# Patient Record
Sex: FEMALE | Race: WHITE | HISPANIC OR LATINO | URBAN - METROPOLITAN AREA
[De-identification: names, ages, dates, MRNs, and addresses within clinical notes are randomized per-mention and may not be internally consistent; named-entity substitution may affect disease eponyms.]

---
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
## 2020-09-07 ENCOUNTER — EMERGENCY (EMERGENCY)
Facility: HOSPITAL | Age: 20
LOS: 1 days | End: 2020-09-07
Admitting: EMERGENCY MEDICINE
Payer: MEDICAID

## 2020-09-07 PROCEDURE — 99285 EMERGENCY DEPT VISIT HI MDM: CPT

## 2020-09-07 PROCEDURE — 76856 US EXAM PELVIC COMPLETE: CPT | Mod: 26

## 2020-09-07 PROCEDURE — 74177 CT ABD & PELVIS W/CONTRAST: CPT | Mod: 26

## 2020-09-10 ENCOUNTER — EMERGENCY (EMERGENCY)
Facility: HOSPITAL | Age: 20
LOS: 1 days | End: 2020-09-10
Admitting: EMERGENCY MEDICINE
Payer: MEDICAID

## 2020-09-10 PROCEDURE — 99053 MED SERV 10PM-8AM 24 HR FAC: CPT

## 2020-09-10 PROCEDURE — 99285 EMERGENCY DEPT VISIT HI MDM: CPT

## 2020-09-11 PROCEDURE — 74176 CT ABD & PELVIS W/O CONTRAST: CPT | Mod: 26

## 2021-06-22 ENCOUNTER — EMERGENCY (EMERGENCY)
Facility: HOSPITAL | Age: 21
LOS: 1 days | End: 2021-06-22
Payer: MEDICAID

## 2021-06-22 PROCEDURE — 99284 EMERGENCY DEPT VISIT MOD MDM: CPT

## 2022-03-01 ENCOUNTER — EMERGENCY (EMERGENCY)
Facility: HOSPITAL | Age: 22
LOS: 1 days | Discharge: HOME | End: 2022-03-01
Admitting: EMERGENCY MEDICINE
Payer: MEDICAID

## 2022-03-01 PROCEDURE — 99284 EMERGENCY DEPT VISIT MOD MDM: CPT

## 2022-03-03 DIAGNOSIS — R10.84 GENERALIZED ABDOMINAL PAIN: ICD-10-CM

## 2024-12-17 ENCOUNTER — INPATIENT (INPATIENT)
Facility: HOSPITAL | Age: 24
LOS: 1 days | Discharge: HOME | End: 2024-12-19
Attending: OBSTETRICS & GYNECOLOGY | Admitting: OBSTETRICS & GYNECOLOGY
Payer: MEDICAID

## 2024-12-17 VITALS
HEART RATE: 121 BPM | RESPIRATION RATE: 18 BRPM | DIASTOLIC BLOOD PRESSURE: 76 MMHG | SYSTOLIC BLOOD PRESSURE: 122 MMHG | TEMPERATURE: 99 F

## 2024-12-17 DIAGNOSIS — Z3A.33 33 WEEKS GESTATION OF PREGNANCY: ICD-10-CM

## 2024-12-17 DIAGNOSIS — O60.00 PRETERM LABOR WITHOUT DELIVERY, UNSPECIFIED TRIMESTER: ICD-10-CM

## 2024-12-17 DIAGNOSIS — Z00.00 ENCOUNTER FOR GENERAL ADULT MEDICAL EXAMINATION WITHOUT ABNORMAL FINDINGS: ICD-10-CM

## 2024-12-17 DIAGNOSIS — O26.893 OTHER SPECIFIED PREGNANCY RELATED CONDITIONS, THIRD TRIMESTER: ICD-10-CM

## 2024-12-17 LAB
ALBUMIN SERPL ELPH-MCNC: 3.5 G/DL — SIGNIFICANT CHANGE UP (ref 3.3–5.2)
ALP SERPL-CCNC: 192 U/L — HIGH (ref 40–120)
ALT FLD-CCNC: 15 U/L — SIGNIFICANT CHANGE UP
ANION GAP SERPL CALC-SCNC: 13 MMOL/L — SIGNIFICANT CHANGE UP (ref 5–17)
APPEARANCE UR: ABNORMAL
APTT BLD: 28.4 SEC — SIGNIFICANT CHANGE UP (ref 24.5–35.6)
AST SERPL-CCNC: 25 U/L — SIGNIFICANT CHANGE UP
BACTERIA # UR AUTO: ABNORMAL /HPF
BASOPHILS # BLD AUTO: 0.02 K/UL — SIGNIFICANT CHANGE UP (ref 0–0.2)
BASOPHILS NFR BLD AUTO: 0.1 % — SIGNIFICANT CHANGE UP (ref 0–2)
BILIRUB SERPL-MCNC: <0.2 MG/DL — LOW (ref 0.4–2)
BILIRUB UR-MCNC: NEGATIVE — SIGNIFICANT CHANGE UP
BLD GP AB SCN SERPL QL: SIGNIFICANT CHANGE UP
BUN SERPL-MCNC: 5.1 MG/DL — LOW (ref 8–20)
CALCIUM SERPL-MCNC: 9.2 MG/DL — SIGNIFICANT CHANGE UP (ref 8.4–10.5)
CAST: 0 /LPF — SIGNIFICANT CHANGE UP (ref 0–4)
CHLORIDE SERPL-SCNC: 103 MMOL/L — SIGNIFICANT CHANGE UP (ref 96–108)
CO2 SERPL-SCNC: 18 MMOL/L — LOW (ref 22–29)
COLOR SPEC: YELLOW — SIGNIFICANT CHANGE UP
CREAT SERPL-MCNC: 0.49 MG/DL — LOW (ref 0.5–1.3)
DIFF PNL FLD: ABNORMAL
EGFR: 133 ML/MIN/1.73M2 — SIGNIFICANT CHANGE UP
EOSINOPHIL # BLD AUTO: 0.01 K/UL — SIGNIFICANT CHANGE UP (ref 0–0.5)
EOSINOPHIL NFR BLD AUTO: 0.1 % — SIGNIFICANT CHANGE UP (ref 0–6)
FIBRINOGEN PPP-MCNC: 636 MG/DL — HIGH (ref 200–450)
GLUCOSE SERPL-MCNC: 86 MG/DL — SIGNIFICANT CHANGE UP (ref 70–99)
GLUCOSE UR QL: NEGATIVE MG/DL — SIGNIFICANT CHANGE UP
HCT VFR BLD CALC: 35.8 % — SIGNIFICANT CHANGE UP (ref 34.5–45)
HGB BLD-MCNC: 12.2 G/DL — SIGNIFICANT CHANGE UP (ref 11.5–15.5)
IMM GRANULOCYTES NFR BLD AUTO: 0.3 % — SIGNIFICANT CHANGE UP (ref 0–0.9)
KETONES UR-MCNC: 15 MG/DL
LEUKOCYTE ESTERASE UR-ACNC: NEGATIVE — SIGNIFICANT CHANGE UP
LYMPHOCYTES # BLD AUTO: 0.99 K/UL — LOW (ref 1–3.3)
LYMPHOCYTES # BLD AUTO: 6.9 % — LOW (ref 13–44)
MCHC RBC-ENTMCNC: 29.8 PG — SIGNIFICANT CHANGE UP (ref 27–34)
MCHC RBC-ENTMCNC: 34.1 G/DL — SIGNIFICANT CHANGE UP (ref 32–36)
MCV RBC AUTO: 87.5 FL — SIGNIFICANT CHANGE UP (ref 80–100)
MONOCYTES # BLD AUTO: 0.17 K/UL — SIGNIFICANT CHANGE UP (ref 0–0.9)
MONOCYTES NFR BLD AUTO: 1.2 % — LOW (ref 2–14)
NEUTROPHILS # BLD AUTO: 13.15 K/UL — HIGH (ref 1.8–7.4)
NEUTROPHILS NFR BLD AUTO: 91.4 % — HIGH (ref 43–77)
NITRITE UR-MCNC: NEGATIVE — SIGNIFICANT CHANGE UP
PH UR: 7.5 — SIGNIFICANT CHANGE UP (ref 5–8)
PLATELET # BLD AUTO: 363 K/UL — SIGNIFICANT CHANGE UP (ref 150–400)
POTASSIUM SERPL-MCNC: 3.8 MMOL/L — SIGNIFICANT CHANGE UP (ref 3.5–5.3)
POTASSIUM SERPL-SCNC: 3.8 MMOL/L — SIGNIFICANT CHANGE UP (ref 3.5–5.3)
PROT SERPL-MCNC: 6.6 G/DL — SIGNIFICANT CHANGE UP (ref 6.6–8.7)
PROT UR-MCNC: NEGATIVE MG/DL — SIGNIFICANT CHANGE UP
RBC # BLD: 4.09 M/UL — SIGNIFICANT CHANGE UP (ref 3.8–5.2)
RBC # FLD: 13.3 % — SIGNIFICANT CHANGE UP (ref 10.3–14.5)
RBC CASTS # UR COMP ASSIST: 3 /HPF — SIGNIFICANT CHANGE UP (ref 0–4)
SODIUM SERPL-SCNC: 134 MMOL/L — LOW (ref 135–145)
SP GR SPEC: 1.01 — SIGNIFICANT CHANGE UP (ref 1–1.03)
SQUAMOUS # UR AUTO: 3 /HPF — SIGNIFICANT CHANGE UP (ref 0–5)
URATE SERPL-MCNC: 4 MG/DL — SIGNIFICANT CHANGE UP (ref 2.4–5.7)
UROBILINOGEN FLD QL: 0.2 MG/DL — SIGNIFICANT CHANGE UP (ref 0.2–1)
WBC # BLD: 14.39 K/UL — HIGH (ref 3.8–10.5)
WBC # FLD AUTO: 14.39 K/UL — HIGH (ref 3.8–10.5)
WBC UR QL: 2 /HPF — SIGNIFICANT CHANGE UP (ref 0–5)

## 2024-12-17 PROCEDURE — 99222 1ST HOSP IP/OBS MODERATE 55: CPT

## 2024-12-17 PROCEDURE — 99234 HOSP IP/OBS SM DT SF/LOW 45: CPT | Mod: GC

## 2024-12-17 RX ORDER — NIFEDIPINE 10 MG
10 CAPSULE ORAL EVERY 6 HOURS
Refills: 0 | Status: DISCONTINUED | OUTPATIENT
Start: 2024-12-17 | End: 2024-12-17

## 2024-12-17 RX ORDER — NIFEDIPINE 10 MG
20 CAPSULE ORAL EVERY 6 HOURS
Refills: 0 | Status: DISCONTINUED | OUTPATIENT
Start: 2024-12-17 | End: 2024-12-18

## 2024-12-17 RX ORDER — 0.9 % SODIUM CHLORIDE 0.9 %
1000 INTRAVENOUS SOLUTION INTRAVENOUS
Refills: 0 | Status: DISCONTINUED | OUTPATIENT
Start: 2024-12-17 | End: 2024-12-19

## 2024-12-17 RX ORDER — 0.9 % SODIUM CHLORIDE 0.9 %
500 INTRAVENOUS SOLUTION INTRAVENOUS ONCE
Refills: 0 | Status: COMPLETED | OUTPATIENT
Start: 2024-12-17 | End: 2024-12-17

## 2024-12-17 RX ORDER — NIFEDIPINE 10 MG
10 CAPSULE ORAL ONCE
Refills: 0 | Status: COMPLETED | OUTPATIENT
Start: 2024-12-17 | End: 2024-12-17

## 2024-12-17 RX ORDER — BETAMETHASONE ACETATE,SOD PHOS 6 MG/ML
12 VIAL (ML) INJECTION EVERY 24 HOURS
Refills: 0 | Status: CANCELLED | OUTPATIENT
Start: 2024-12-18 | End: 2024-12-17

## 2024-12-17 RX ORDER — AMPICILLIN TRIHYDRATE 250 MG/5ML
1 SUSPENSION, RECONSTITUTED, ORAL (ML) ORAL EVERY 4 HOURS
Refills: 0 | Status: DISCONTINUED | OUTPATIENT
Start: 2024-12-17 | End: 2024-12-17

## 2024-12-17 RX ORDER — .BETA.-CAROTENE, SODIUM ACETATE, ASCORBIC ACID, CHOLECALCIFEROL, .ALPHA.-TOCOPHEROL ACETATE, DL-, THIAMINE MONONITRATE, RIBOFLAVIN, NIACINAMIDE, PYRIDOXINE HYDROCHLORIDE, FOLIC ACID, CYANOCOBALAMIN, CALCIUM CARBONATE, FERROUS FUMARATE, ZINC OXIDE AND CUPRIC OXIDE 2000; 2000; 120; 400; 22; 1.84; 3; 20; 10; 1; 12; 200; 27; 25; 2 [IU]/1; [IU]/1; MG/1; [IU]/1; MG/1; MG/1; MG/1; MG/1; MG/1; MG/1; UG/1; MG/1; MG/1; MG/1; MG/1
1 TABLET ORAL DAILY
Refills: 0 | Status: DISCONTINUED | OUTPATIENT
Start: 2024-12-17 | End: 2024-12-19

## 2024-12-17 RX ORDER — BETAMETHASONE ACETATE,SOD PHOS 6 MG/ML
12 VIAL (ML) INJECTION EVERY 24 HOURS
Refills: 0 | Status: DISCONTINUED | OUTPATIENT
Start: 2024-12-18 | End: 2024-12-18

## 2024-12-17 RX ORDER — AMPICILLIN TRIHYDRATE 250 MG/5ML
1 SUSPENSION, RECONSTITUTED, ORAL (ML) ORAL EVERY 4 HOURS
Refills: 0 | Status: DISCONTINUED | OUTPATIENT
Start: 2024-12-17 | End: 2024-12-18

## 2024-12-17 RX ADMIN — Medication 10 MILLIGRAM(S): at 15:07

## 2024-12-17 RX ADMIN — Medication 10 MILLIGRAM(S): at 17:37

## 2024-12-17 RX ADMIN — Medication 125 MILLILITER(S): at 17:00

## 2024-12-17 RX ADMIN — Medication 1000 MILLILITER(S): at 16:30

## 2024-12-17 RX ADMIN — Medication 108 GRAM(S): at 15:09

## 2024-12-17 RX ADMIN — Medication 125 MILLILITER(S): at 23:58

## 2024-12-17 RX ADMIN — Medication 108 GRAM(S): at 23:56

## 2024-12-17 RX ADMIN — Medication 20 MILLIGRAM(S): at 23:57

## 2024-12-17 RX ADMIN — Medication 108 GRAM(S): at 19:25

## 2024-12-17 NOTE — DISCHARGE NOTE ANTEPARTUM - FINANCIAL ASSISTANCE
[facility] provides services at a reduced cost to those who are determined to be eligible through [facility]’s financial assistance program. Information regarding [facility]’s financial assistance program can be found by going to [email or web address] or by calling [phone].

## 2024-12-17 NOTE — DISCHARGE NOTE ANTEPARTUM - NS MD DC FALL RISK RISK
For information on Fall & Injury Prevention, visit: [email or web address] OR  [email or web address] OR  https://www.cdc.gov/steadi/patient.html

## 2024-12-17 NOTE — CONSULT NOTE ADULT - ATTENDING COMMENTS
Pregnancy complicated by  labor. Recommend increasing the nifedipine dose to 20 mg q 6 h for tocolysis. Complete steroid course. Continue Ampicillin for GBS prophylaxis and continuous fetal/maternal monitoring.

## 2024-12-17 NOTE — CONSULT NOTE ADULT - PROBLEM SELECTOR RECOMMENDATION 9
Patient found to make cervical change between Friday and today from closed to 2cm. She was unchanged upon arrival at [facility]. She states her contractions have subsided, but they are still present on tocomotry, will continue with procardia 10IR q6h for tocoloysis and continuous uterine monitoring. Betamethasone was administered at 0830 this morning for fetal lung maturity, will give second dose tomorrow morning. She was also started on ampicillin q6h for GBS unknown status, swabs pending, will follow up. Vaginitis and GCCT swabs were also collected. Urinalysis was negative, urine culture is pending. Patient found to make cervical change between Friday and today from closed to 2cm. She was unchanged upon arrival at [facility]. She states her contractions have subsided, but they are still present on tocomotry, will continue with procardia 10IR q6h for tocoloysis and continuous uterine monitoring. Betamethasone was administered at 0830 this morning for fetal lung maturity, will give second dose tomorrow morning. She was also started on ampicillin for GBS unknown status, swabs pending, will follow up. Vaginitis and GCCT swabs were also collected. Urinalysis was negative, urine culture is pending. Patient found to make cervical change between Friday and today from closed to 2.5cm. She was unchanged upon arrival at [facility]. She states her contractions have subsided, but they are still present on tocometry, we recommend continue procardia 10mg IR q6h for tocolysis and continuous uterine monitoring. Betamethasone was administered at 0830 this morning for fetal lung maturity, will give second dose tomorrow morning. She was also started on ampicillin for GBS unknown status, GBS culture is pending. Vaginitis and GCCT swabs were also collected. Urinalysis was negative, urine culture is pending. She made cervical change between Friday and today from closed to 2.5cm. She was unchanged upon arrival at [facility]. She states her contractions have subsided, but they are still present on tocometry, we recommend continue procardia 10mg IR q6h for tocolysis and continuous uterine monitoring. Betamethasone was administered at 0830 this morning for fetal lung maturity, will give second dose tomorrow morning. She was also started on ampicillin for GBS unknown status, GBS culture is pending. Vaginitis and GCCT swabs were also collected. Urinalysis was negative, urine culture is pending.

## 2024-12-17 NOTE — DISCHARGE NOTE ANTEPARTUM - PATIENT PORTAL LINK FT
You can access the FollowMyHealth Patient Portal offered by [facility] by registering at the following website: [email or web address]. By joining [employer]’s FollowMyHealth portal, you will also be able to view your health information using other applications (apps) compatible with our system.

## 2024-12-17 NOTE — CONSULT NOTE ADULT - PROBLEM SELECTOR RECOMMENDATION 2
Patient in  labor, will continue with procardia for tocolysis until completion of betamethasone course, final dose due tomorrow morning. Ampicillin for GBS unknown status has been ordered. Continue with daily prenatal vitamins. FHT has been category 1, continue with continuous fetal monitoring for now. Growth scan today performed with Dr. Becerra, estimated fetal weight appropriate for gestational age (20.1%ile), vertex presentation. Patient in  labor, will continue with procardia for tocolysis until completion of betamethasone course, second dose due tomorrow morning. Ampicillin for GBS unknown status has been ordered. Continue with daily prenatal vitamins. FHT has been category 1, continue with continuous fetal monitoring for now.     Growth scan today performed EFW 2200g (20.1%ile), and vertex presentation. Bladder, bilateral kidneys, stomach, diaphragm, and 4 chamber views visualized.    NICU consultation can be considered if she has questions and makes further cervical change. Patient in  labor, will continue with procardia for tocolysis until completion of betamethasone course, second dose due tomorrow morning. Ampicillin for GBS unknown status has been ordered. Continue with daily prenatal vitamins. FHT has been category 1, continue with continuous fetal monitoring. Ultrasound scan today: EFW 2200g (20.1%ile), and vertex presentation.  NICU consultation is recommended

## 2024-12-17 NOTE — CONSULT NOTE ADULT - SUBJECTIVE AND OBJECTIVE BOX
BRAXTON MONTAGUE  A 24year old  Last Menstrual Period 24   EDC 25 at 33.4 weeks Gestational Age  transferred from [facility] for  labor. She was tod for past week, went to hospital on 12/3 was found to be C/L/P and discharged home. She was then reevaluated today for similar issues and found to have made change to 2.5/70/-3 and was admitted for transfer to [facility] for  labor. At [facility], she was given betamethasone at 0828, procardia loading dose x3 (last at 0915) and ampicillin. GBS and GC/CT swabs were collected, UA was found to be negative. Since she received the procardia she has not felt the contractions as much. At this time she feels well. Endorses good FM.    POB:   PGYN: -fibroids, -ovarian cysts, denies STD hx, denies abnormal PAPs   PMH: Denies  PSH: Liposuction, breast augmentation  SH: Denies EtOH, tobacco and illicit drug use during this pregnancy; feels safe at home   Meds: PNVs  Allergies: NKDA    Vital Signs:  Vital Signs Last 24 Hrs  T(C): 36.8 (17 Dec 2024 13:13), Max: 37.1 (17 Dec 2024 11:51)  T(F): 98.2 (17 Dec 2024 13:13), Max: 98.8 (17 Dec 2024 11:51)  HR: 112 (17 Dec 2024 13:13) (112 - 121)  BP: 120/71 (17 Dec 2024 13:13) (120/71 - 122/76)  RR: 19 (17 Dec 2024 13:13) (18 - 19)    Height (cm): 154.9 (24 @ 13:13)  Weight (kg): 68 (24 @ 13:13)  BMI (kg/m2): 28.3 (24 @ 13:13)  BSA (m2): 1.67 (24 @ 13:13)    Physical Exam:  General: Adult female in NAD  Head/Neck: No neck masses, no lymphadenopathy  Neuro: A&Ox3  Resp: breathing comfortably on RA   Abdomen: soft, non-tender, gravid uterus  Pelvic: as per ob resident note  Ext: No cyanosis, edema or calf tenderness  Skin: No rashes or lesions on exposed skin  Psych: normal affect and appropriate eye contact    FHT: 150 baseline, moderate variability, +accelerations, -decelerations   [place]: contractions q3 minutes    Bedside sono: vtx, levar 11.53cm, efw 2040g (20.1%ile)    Labs:                      12.2   14.39 )-----------( 363      ( 17 Dec 2024 12:12 )             35.8     12    134[L]  |  103  |  5.1[L]  ----------------------------<  86  3.8   |  18.0[L]  |  0.49[L]    Ca    9.2      17 Dec 2024 12:12    TPro  6.6  /  Alb  3.5  /  TBili  <0.2[L]  /  DBili  x   /  AST  25  /  ALT  15  /  AlkPhos  192[H]  12-17    PTT - ( 17 Dec 2024 12:12 )  PTT:28.4 sec    MEDICATIONS  (STANDING):  NIFEdipine IR 10 milliGRAM(s) Oral every 6 hours   BRAXTON MONTAGUE  A 24year old  Last Menstrual Period 24   EDC 25 at 33.5 weeks Gestational Age  transferred from [facility] for  labor. She was tod for past week, went to hospital on 12/3 was found to be C/L/P and discharged home. She was then reevaluated today for similar issues and found to have made change to 2.5/70/-3 and was admitted for transfer to [facility] for  labor. At [facility], she was given betamethasone at 0828, procardia loading dose x3 (last at 0915) and ampicillin. GBS and GC/CT swabs were collected, UA was found to be negative. Since she received the procardia she has not felt the contractions as much. At this time she feels well. Endorses good FM.    POB:   PGYN: -fibroids, -ovarian cysts, denies STD hx, denies abnormal PAPs   PMH: Denies  PSH: Liposuction, breast augmentation  SH: Denies EtOH, tobacco and illicit drug use during this pregnancy; feels safe at home   Meds: PNVs  Allergies: NKDA    Vital Signs:  Vital Signs Last 24 Hrs  T(C): 36.8 (17 Dec 2024 13:13), Max: 37.1 (17 Dec 2024 11:51)  T(F): 98.2 (17 Dec 2024 13:13), Max: 98.8 (17 Dec 2024 11:51)  HR: 112 (17 Dec 2024 13:13) (112 - 121)  BP: 120/71 (17 Dec 2024 13:13) (120/71 - 122/76)  RR: 19 (17 Dec 2024 13:13) (18 - 19)    Height (cm): 154.9 (24 @ 13:13)  Weight (kg): 68 (24 @ 13:13)  BMI (kg/m2): 28.3 (24 @ 13:13)  BSA (m2): 1.67 (24 @ 13:13)    Physical Exam:  General: Adult female in NAD  Head/Neck: No neck masses, no lymphadenopathy  Neuro: A&Ox3  Resp: breathing comfortably on RA   Abdomen: soft, non-tender, gravid uterus  Pelvic: as per ob resident note  Ext: No cyanosis, edema or calf tenderness  Skin: No rashes or lesions on exposed skin  Psych: normal affect and appropriate eye contact    FHT: 150 baseline, moderate variability, +accelerations, -decelerations   [place]: contractions q3 minutes    Bedside sono: vtx, levar 11.53cm, efw 2040g (20.1%ile)    Labs:                      12.2   14.39 )-----------( 363      ( 17 Dec 2024 12:12 )             35.8     12    134[L]  |  103  |  5.1[L]  ----------------------------<  86  3.8   |  18.0[L]  |  0.49[L]    Ca    9.2      17 Dec 2024 12:12    TPro  6.6  /  Alb  3.5  /  TBili  <0.2[L]  /  DBili  x   /  AST  25  /  ALT  15  /  AlkPhos  192[H]  12-17    PTT - ( 17 Dec 2024 12:12 )  PTT:28.4 sec    MEDICATIONS  (STANDING):  NIFEdipine IR 10 milliGRAM(s) Oral every 6 hours   BRAXTON MONTAGUE  A 24 year old  Last Menstrual Period 24 EDC 25 at 33 weeks and 5 gestational age. She presented as a transfer from [facility] for  labor. She was tod for the past week, was evaluated at the hospital on 12/3 and was found to be closed and therefore discharged home. She was then reevaluated today for similar issues and found to have made change to 2.5//-3. At [facility], she was given betamethasone at 0828, procardia loading dose x3 (last at 0915) and ampicillin. GBS and GC/CT swabs were collected, UA was found to be negative. She was then transferred to [facility] for  labor. Since she received the procardia she has not felt the contractions as much. At this time she feels well. Endorses good FM.    POB:   PGYN: -fibroids, -ovarian cysts, denies STD hx, denies abnormal PAPs   PMH: Denies  PSH: Liposuction, breast augmentation  SH: Denies EtOH, tobacco and illicit drug use during this pregnancy; feels safe at home   Meds: PNVs  Allergies: NKDA    Vital Signs:  Vital Signs Last 24 Hrs  T(C): 36.8 (17 Dec 2024 13:13), Max: 37.1 (17 Dec 2024 11:51)  T(F): 98.2 (17 Dec 2024 13:13), Max: 98.8 (17 Dec 2024 11:51)  HR: 112 (17 Dec 2024 13:13) (112 - 121)  BP: 120/71 (17 Dec 2024 13:13) (120/71 - 122/76)  RR: 19 (17 Dec 2024 13:13) (18 - 19)    Height (cm): 154.9 (24 @ 13:13)  Weight (kg): 68 (24 @ 13:13)  BMI (kg/m2): 28.3 (24 @ 13:13)  BSA (m2): 1.67 (24 @ 13:13)    Physical Exam:  General: Adult female in NAD  Head/Neck: No neck masses, no lymphadenopathy  Neuro: A&Ox3  Resp: breathing comfortably on RA   Abdomen: soft, non-tender, gravid uterus  Pelvic: as per ob resident note  Ext: No cyanosis, edema or calf tenderness  Skin: No rashes or lesions on exposed skin  Psych: normal affect and appropriate eye contact    FHT: 150 baseline, moderate variability, +accelerations, -decelerations   [place]: contractions q3 minutes    Bedside sono: vtx, levar 11.53cm, efw 2040g (20.1%ile)    Labs:                      12.2   14.39 )-----------( 363      ( 17 Dec 2024 12:12 )             35.8     12-    134[L]  |  103  |  5.1[L]  ----------------------------<  86  3.8   |  18.0[L]  |  0.49[L]    Ca    9.2      17 Dec 2024 12:12    TPro  6.6  /  Alb  3.5  /  TBili  <0.2[L]  /  DBili  x   /  AST  25  /  ALT  15  /  AlkPhos  192[H]  12-17    PTT - ( 17 Dec 2024 12:12 )  PTT:28.4 sec    MEDICATIONS  (STANDING):  NIFEdipine IR 10 milliGRAM(s) Oral every 6 hours   BRAXTON MONTAGUE  A 24 year old  Last Menstrual Period 24 EDC 25 at 33 weeks and 5 gestational age. She presented as a transfer from [facility] for  labor. She was having uterine contractions for the past week, was evaluated at the hospital on 12/3/24 and was found to have a close cervix. She was discharged home. She was evaluated today for similar symptoms. She was found to have cervical changes (Cx 2.5/70/-3). At [facility], she was given betamethasone at 0828, Pprocardia loading dose x3 (last at 0915) and Ampicillin. GBS and GC/CT swabs were collected, UA was found to be negative. She was then transferred to [facility] with a diagnosis of  labor. Since she received the procardia she has less frequent and less severe uterine contractions. At this time she feels well. Endorses good FM.    POB:   PGYN: -fibroids, -ovarian cysts, denies STD hx, denies abnormal PAPs   PMH: Denies  PSH: Liposuction, breast augmentation  SH: Denies EtOH, tobacco and illicit drug use during this pregnancy; feels safe at home   Meds: PNVs  Allergies: NKDA    Vital Signs:  Vital Signs Last 24 Hrs  T(C): 36.8 (17 Dec 2024 13:13), Max: 37.1 (17 Dec 2024 11:51)  T(F): 98.2 (17 Dec 2024 13:13), Max: 98.8 (17 Dec 2024 11:51)  HR: 112 (17 Dec 2024 13:13) (112 - 121)  BP: 120/71 (17 Dec 2024 13:13) (120/71 - 122/76)  RR: 19 (17 Dec 2024 13:13) (18 - 19)    Height (cm): 154.9 (24 @ 13:13)  Weight (kg): 68 (24 @ 13:13)  BMI (kg/m2): 28.3 (24 @ 13:13)  BSA (m2): 1.67 (24 @ 13:13)    Physical Exam:  General: Adult female in NAD  Head/Neck: No neck masses, no lymphadenopathy  Neuro: A&Ox3  Resp: breathing comfortably on RA   Abdomen: soft, non-tender, gravid uterus  Pelvic: as per ob resident note  Ext: No cyanosis, edema or calf tenderness  Skin: No rashes or lesions on exposed skin  Psych: normal affect and appropriate eye contact    FHT: 150 baseline, moderate variability, +accelerations, -decelerations   [place]: contractions q3 minutes    Bedside sono: vtx, levar 11.53cm, efw 2040g (20.1%ile)    Labs:                      12.2   14.39 )-----------( 363      ( 17 Dec 2024 12:12 )             35.8     12-    134[L]  |  103  |  5.1[L]  ----------------------------<  86  3.8   |  18.0[L]  |  0.49[L]    Ca    9.2      17 Dec 2024 12:12    TPro  6.6  /  Alb  3.5  /  TBili  <0.2[L]  /  DBili  x   /  AST  25  /  ALT  15  /  AlkPhos  192[H]  12-17    PTT - ( 17 Dec 2024 12:12 )  PTT:28.4 sec    MEDICATIONS  (STANDING):  NIFEdipine IR 10 milliGRAM(s) Oral every 6 hours   BRAXTON MONTAGUE is a 24 year old , by LMP 24 with EDC of  25, at 33 weeks and 5 gestational age. She presented as a transfer from [facility] for  labor. She was having uterine contractions for the past week, was evaluated at the hospital on 12/3/24 and found to have a close cervix. She was discharged home at that time. She was evaluated today for similar symptoms. She was found to have cervical changes (Cx 2.5/70/-3). At [facility], she was given betamethasone at 0828, Procardia loading dose x3 (last at 0915) and Ampicillin. GBS and GC/CT swabs were collected, UA was found to be negative. She was then transferred to [facility] with a diagnosis of  labor. Since she received the procardia she has less frequent and less severe uterine contractions. At this time she feels well. Endorses good FM.    POB:   PGYN: -fibroids, -ovarian cysts, denies STD hx, denies abnormal PAPs   PMH: Denies  PSH: Liposuction, breast augmentation  SH: Denies EtOH, tobacco and illicit drug use during this pregnancy; feels safe at home   Meds: PNVs  Allergies: NKDA    Vital Signs:  Vital Signs Last 24 Hrs  T(C): 36.8 (17 Dec 2024 13:13), Max: 37.1 (17 Dec 2024 11:51)  T(F): 98.2 (17 Dec 2024 13:13), Max: 98.8 (17 Dec 2024 11:51)  HR: 112 (17 Dec 2024 13:13) (112 - 121)  BP: 120/71 (17 Dec 2024 13:13) (120/71 - 122/76)  RR: 19 (17 Dec 2024 13:13) (18 - 19)    Height (cm): 154.9 (24 @ 13:13)  Weight (kg): 68 (24 @ 13:13)  BMI (kg/m2): 28.3 (24 @ 13:13)  BSA (m2): 1.67 (24 @ 13:13)    Physical Exam:  General: Adult female in NAD  Head/Neck: No neck masses, no lymphadenopathy  Neuro: A&Ox3  Resp: breathing comfortably on RA   Abdomen: soft, non-tender, gravid uterus  Pelvic: as per ob resident note  Ext: No cyanosis, edema or calf tenderness  Skin: No rashes or lesions on exposed skin  Psych: normal affect and appropriate eye contact    FHT: 150 baseline, moderate variability, +accelerations, -decelerations   [place]: contractions q3 minutes    Bedside sono: vtx, levar 11.53cm, efw 2040g (20.1%ile)    Labs:                      12.2   14.39 )-----------( 363      ( 17 Dec 2024 12:12 )             35.8     12-    134[L]  |  103  |  5.1[L]  ----------------------------<  86  3.8   |  18.0[L]  |  0.49[L]    Ca    9.2      17 Dec 2024 12:12    TPro  6.6  /  Alb  3.5  /  TBili  <0.2[L]  /  DBili  x   /  AST  25  /  ALT  15  /  AlkPhos  192[H]  12-17    PTT - ( 17 Dec 2024 12:12 )  PTT:28.4 sec    MEDICATIONS  (STANDING):  NIFEdipine IR 10 milliGRAM(s) Oral every 6 hours

## 2024-12-17 NOTE — CHART NOTE - NSCHARTNOTEFT_GEN_A_CORE
Called to patient room for rupture of membranes. Sohail pad with clear fluid and old blood noted. SSE confirmed + pooling. Rpt SVE 3.5/80/-2. Patient being moved back downstairs on L&D for continuous monitoring. Plan to continue procardia for tocolysis until 2nd dose of betamethasone administered at 0830. Continue ampicillin for GBS unknown status.  D/w Dr. Elias
Patient reports her contractions feel like they have spaced out to every 5-10m. Not more painful. NST remains reactive. Patient cleared to be moved upstairs with NST qshift. Patient to be continued on procardia for tocolysis and notify us if she feels her contractions worsen or become more frequent. Plan for BMZ in the morning for 2nd dose. Continue on Amp for GBS ppx.   D/w Dr. Elias

## 2024-12-17 NOTE — CONSULT NOTE ADULT - NSCONSULTADDITIONALINFOA_GEN_ALL_CORE
MFM Fellow Addendum    I have personally evaluated this patient and made relevant edits to the note above. She was discussed with Dr Johansen M Attending.     Pernell Becerra DO   M Fellow

## 2024-12-17 NOTE — OB PROVIDER H&P - ATTENDING COMMENTS
24y  at 33w4d GA by LMP consistent with first trimester sono who presents to L&D as a direct transfer from [facility] for  labor. She has been tod for past week, went to hospital on 12/3 was found to be C/L/P and discharged home. She was then reevaluated today for similar issues and found to have made change to 2.5/70/-3 and was admitted for transfer to [facility] for PTL. At [facility] she was given betamethasone at 828, procardia loading dose x3 (last at 915) and ampicillin. GBS and GC/CT was collected, UA was found to be negative. Since she received the procardia she has not felt the contractions as much. At this time she feels well. Endorses good FM.  T(C): 37.1 (24 @ 11:51), Max: 37.1 (-24 @ 11:51)  HR: 112 (24 @ 12:08) (112 - 121)  BP: 120/71 (-- @ 12:08) (120/71 - 122/76)  FHT -  reassuring for GA  toco - contractions q3  SVE: 2.5/80/-3  Bedside sono: Vertex  25 y/o  @ 33+4 with  labor   - admit to Antepartum   - consent obtained  - MFM consult  - plan betamethasone #2     Estrella Uribe MD

## 2024-12-17 NOTE — OB RN TRIAGE NOTE - FALL HARM RISK - UNIVERSAL INTERVENTIONS
Bed in lowest position, wheels locked, appropriate side rails in place/Call bell, personal items and telephone in reach/Instruct patient to call for assistance before getting out of bed or chair/Non-slip footwear when patient is out of bed/[place] to call system/Physically safe environment - no spills, clutter or unnecessary equipment/Purposeful Proactive Rounding/Room/bathroom lighting operational, light cord in reach

## 2024-12-17 NOTE — OB PROVIDER H&P - NSHPPHYSICALEXAM_GEN_ALL_CORE
T(C): 37.1 (12-17-24 @ 11:51), Max: 37.1 (12-17-24 @ 11:51)  HR: 112 (12-17-24 @ 12:08) (112 - 121)  BP: 120/71 (12-17-24 @ 12:08) (120/71 - 122/76)  RR: 18 (12-17-24 @ 11:51) (18 - 18)  SpO2: --  Gen: NAD, well-appearing   Abd: Soft, gravid  Ext: non-tender, non-edematous  SVE: 2.5/80/-3  Bedside sono: Vertex  FHT: 150 baseline, moderate variability, + accel,s -decels  [place]: q3 min

## 2024-12-17 NOTE — CONSULT NOTE ADULT - PROBLEM SELECTOR RECOMMENDATION 3
Regular diet and IV hydration have been ordered. Will keep type and screen active and start subcutaneous heparin for DVT prophylaxis on hospital day 3 if still admitted. Regular diet and IV hydration have been ordered. Will keep type and screen active and consider starting subcutaneous heparin for DVT prophylaxis on hospital day 3 if still admitted. Regular diet and IV hydration have been ordered. Will keep type and screen active. Consider starting subcutaneous heparin for DVT prophylaxis on hospital day 3 if still admitted.

## 2024-12-17 NOTE — CONSULT NOTE ADULT - ASSESSMENT
24 year old  at 33.4 weeks gestational age admitted for management of  labor.     discussed with Dr. Becerra, maternal fetal medicine fellow 24 year old  at 33.5 weeks gestational age admitted for management of  labor.     discussed with Dr. Becerra, maternal fetal medicine fellow 24 year old  at 33 weeks 5 days gestational age admitted for management of  labor.     Discussed with Dr. Becerra, maternal fetal medicine fellow

## 2024-12-17 NOTE — PROGRESS NOTE ADULT - ASSESSMENT
IUP 33w4d with  labor  stable condition    efm  iv fluids  regular diet  2nd dose of betamethasone tomorrow ~0830  MFM aware

## 2024-12-17 NOTE — OB RN PATIENT PROFILE - FALL HARM RISK - UNIVERSAL INTERVENTIONS
Spontaneous, unlabored and symmetrical
Bed in lowest position, wheels locked, appropriate side rails in place/Call bell, personal items and telephone in reach/Instruct patient to call for assistance before getting out of bed or chair/Non-slip footwear when patient is out of bed/[place] to call system/Physically safe environment - no spills, clutter or unnecessary equipment/Purposeful Proactive Rounding/Room/bathroom lighting operational, light cord in reach

## 2024-12-17 NOTE — OB PROVIDER H&P - HISTORY OF PRESENT ILLNESS
24y  at 33w4d GA by LMP consistent with first trimester adriana who presents to L&D as a direct transfer from [facility] for  labor. She has been tod for past week, went to hospital on 12/3 was found to be C/L/P and discharged home. She was then reevaluated today for similar issues and found to have made change to 2.5/70/-3 and was admitted for transfer to [facility] for PTL. At [facility] she was given betamethasone at 828, procardia loading dose x3 (last at 915) and ampicillin. GBS and GC/CT was collected, UA was found to be negative. Since she received the procardia she has not felt the contractions as much. At this time she feels well. Endorses good FM.    KENYATTA: 2025  LMP: 2024    Prenatal course uncomplicated.      Patient denies vaginal bleeding and leakage of fluid. She endorses good fetal movement. Denies fevers, chills, nausea and vomiting. No other complaints at this time.     POB:   PGYN: -fibroids, -ovarian cysts, denies STD hx, denies abnormal PAPs   PMH: Denies  PSH: Liposuction, breast augmentation  SH: Denies EtOH, tobacco and illicit drug use during this pregnancy; feels safe at home   Meds: PNVs  Allergies: NKDA    Sono: Vertex posterior  EFW: [id number]

## 2024-12-17 NOTE — OB PROVIDER H&P - ASSESSMENT
A/P: 24y  at 33w4d GA by LMP consistent with first trimester sono who presents to L&D as a direct transfer from [facility] for  labor  -Admit to L&D for  labor  -MFM consult  -Procardia 10IR q6hr  -Betamethasone q24hr for 2 doses (next at 830 am)  -Ampicillin q6hr for GBS unknown, collected at [facility]  -f/u Gc/CT  -Continuous monitoring  -IV hydration    Discussed with Dr. Uribe

## 2024-12-17 NOTE — PROGRESS NOTE ADULT - SUBJECTIVE AND OBJECTIVE BOX
25 yo G1 at 33w4d transferred from [facility] this a.m. secondary to  labor.  Patient seen on Friday for  contractions.  No dilation and contractions decreased.  Patient reports feeling well over the weekend, and then this a.m. began with cramping and noted blood.  Cervix found to be 2.5 cm dilated.  IV fluids and po procardia given.  1st dose of betamethasone given at 0828  Procardia po started.  IV ampicillin for unknown GBS  GBS culture, gc/ct, and urine culture sent.    LMP 24 and KENYATTA 25     with moderate variability  +accels, no decels  Cat 1  [place] irregular contractions

## 2024-12-18 LAB
BASOPHILS # BLD AUTO: 0.02 K/UL — SIGNIFICANT CHANGE UP (ref 0–0.2)
BASOPHILS NFR BLD AUTO: 0.1 % — SIGNIFICANT CHANGE UP (ref 0–2)
CANDIDA AB TITR SER: SIGNIFICANT CHANGE UP
CULTURE RESULTS: SIGNIFICANT CHANGE UP
EOSINOPHIL # BLD AUTO: 0 K/UL — SIGNIFICANT CHANGE UP (ref 0–0.5)
EOSINOPHIL NFR BLD AUTO: 0 % — SIGNIFICANT CHANGE UP (ref 0–6)
G VAGINALIS DNA SPEC QL NAA+PROBE: DETECTED
HCT VFR BLD CALC: 28.8 % — LOW (ref 34.5–45)
HGB BLD-MCNC: 9.9 G/DL — LOW (ref 11.5–15.5)
IMM GRANULOCYTES NFR BLD AUTO: 0.5 % — SIGNIFICANT CHANGE UP (ref 0–0.9)
LYMPHOCYTES # BLD AUTO: 1.53 K/UL — SIGNIFICANT CHANGE UP (ref 1–3.3)
LYMPHOCYTES # BLD AUTO: 8.1 % — LOW (ref 13–44)
MCHC RBC-ENTMCNC: 30.9 PG — SIGNIFICANT CHANGE UP (ref 27–34)
MCHC RBC-ENTMCNC: 34.4 G/DL — SIGNIFICANT CHANGE UP (ref 32–36)
MCV RBC AUTO: 90 FL — SIGNIFICANT CHANGE UP (ref 80–100)
MONOCYTES # BLD AUTO: 0.91 K/UL — HIGH (ref 0–0.9)
MONOCYTES NFR BLD AUTO: 4.8 % — SIGNIFICANT CHANGE UP (ref 2–14)
NEUTROPHILS # BLD AUTO: 16.36 K/UL — HIGH (ref 1.8–7.4)
NEUTROPHILS NFR BLD AUTO: 86.5 % — HIGH (ref 43–77)
PLATELET # BLD AUTO: 307 K/UL — SIGNIFICANT CHANGE UP (ref 150–400)
RBC # BLD: 3.2 M/UL — LOW (ref 3.8–5.2)
RBC # FLD: 13.3 % — SIGNIFICANT CHANGE UP (ref 10.3–14.5)
SPECIMEN SOURCE: SIGNIFICANT CHANGE UP
T PALLIDUM AB TITR SER: NEGATIVE — SIGNIFICANT CHANGE UP
T VAGINALIS SPEC QL WET PREP: SIGNIFICANT CHANGE UP
WBC # BLD: 18.91 K/UL — HIGH (ref 3.8–10.5)
WBC # FLD AUTO: 18.91 K/UL — HIGH (ref 3.8–10.5)

## 2024-12-18 PROCEDURE — 88307 TISSUE EXAM BY PATHOLOGIST: CPT | Mod: 26

## 2024-12-18 PROCEDURE — 59409 OBSTETRICAL CARE: CPT | Mod: U7

## 2024-12-18 RX ORDER — IBUPROFEN 200 MG
600 TABLET ORAL EVERY 6 HOURS
Refills: 0 | Status: COMPLETED | OUTPATIENT
Start: 2024-12-18 | End: 2025-11-16

## 2024-12-18 RX ORDER — ACETAMINOPHEN 500MG 500 MG/1
3 TABLET, COATED ORAL
Qty: 84 | Refills: 0
Start: 2024-12-18 | End: 2024-12-24

## 2024-12-18 RX ORDER — ACETAMINOPHEN 500MG 500 MG/1
975 TABLET, COATED ORAL
Refills: 0 | Status: DISCONTINUED | OUTPATIENT
Start: 2024-12-18 | End: 2024-12-19

## 2024-12-18 RX ORDER — KETOROLAC TROMETHAMINE 30 MG/ML
30 INJECTION INTRAMUSCULAR; INTRAVENOUS ONCE
Refills: 0 | Status: DISCONTINUED | OUTPATIENT
Start: 2024-12-18 | End: 2024-12-18

## 2024-12-18 RX ORDER — IBUPROFEN 200 MG
600 TABLET ORAL EVERY 6 HOURS
Refills: 0 | Status: DISCONTINUED | OUTPATIENT
Start: 2024-12-18 | End: 2024-12-19

## 2024-12-18 RX ORDER — PRAMOXINE HYDROCHLORIDE 1 MG/ML
1 LOTION TOPICAL EVERY 4 HOURS
Refills: 0 | Status: DISCONTINUED | OUTPATIENT
Start: 2024-12-18 | End: 2024-12-19

## 2024-12-18 RX ORDER — TETANUS TOXOID, REDUCED DIPHTHERIA TOXOID AND ACELLULAR PERTUSSIS VACCINE, ADSORBED 5; 2.5; 8; 8; 2.5 [IU]/.5ML; [IU]/.5ML; UG/.5ML; UG/.5ML; UG/.5ML
0.5 SUSPENSION INTRAMUSCULAR ONCE
Refills: 0 | Status: COMPLETED | OUTPATIENT
Start: 2024-12-18

## 2024-12-18 RX ORDER — LANOLIN 72 %
1 OINTMENT (GRAM) TOPICAL EVERY 6 HOURS
Refills: 0 | Status: DISCONTINUED | OUTPATIENT
Start: 2024-12-18 | End: 2024-12-19

## 2024-12-18 RX ORDER — OXYCODONE HYDROCHLORIDE 30 MG/1
5 TABLET ORAL
Refills: 0 | Status: DISCONTINUED | OUTPATIENT
Start: 2024-12-18 | End: 2024-12-19

## 2024-12-18 RX ORDER — .BETA.-CAROTENE, SODIUM ACETATE, ASCORBIC ACID, CHOLECALCIFEROL, .ALPHA.-TOCOPHEROL ACETATE, DL-, THIAMINE MONONITRATE, RIBOFLAVIN, NIACINAMIDE, PYRIDOXINE HYDROCHLORIDE, FOLIC ACID, CYANOCOBALAMIN, CALCIUM CARBONATE, FERROUS FUMARATE, ZINC OXIDE AND CUPRIC OXIDE 2000; 2000; 120; 400; 22; 1.84; 3; 20; 10; 1; 12; 200; 27; 25; 2 [IU]/1; [IU]/1; MG/1; [IU]/1; MG/1; MG/1; MG/1; MG/1; MG/1; MG/1; UG/1; MG/1; MG/1; MG/1; MG/1
1 TABLET ORAL DAILY
Refills: 0 | Status: DISCONTINUED | OUTPATIENT
Start: 2024-12-18 | End: 2024-12-19

## 2024-12-18 RX ORDER — DIBUCAINE 1 %
1 OINTMENT (GRAM) TOPICAL EVERY 6 HOURS
Refills: 0 | Status: DISCONTINUED | OUTPATIENT
Start: 2024-12-18 | End: 2024-12-19

## 2024-12-18 RX ORDER — SODIUM CHLORIDE 9 MG/ML
3 INJECTION, SOLUTION INTRAMUSCULAR; INTRAVENOUS; SUBCUTANEOUS EVERY 8 HOURS
Refills: 0 | Status: DISCONTINUED | OUTPATIENT
Start: 2024-12-18 | End: 2024-12-19

## 2024-12-18 RX ORDER — OXYCODONE HYDROCHLORIDE 30 MG/1
5 TABLET ORAL ONCE
Refills: 0 | Status: DISCONTINUED | OUTPATIENT
Start: 2024-12-18 | End: 2024-12-19

## 2024-12-18 RX ORDER — WITCH HAZEL 50 G/100ML
1 SOLUTION RECTAL EVERY 4 HOURS
Refills: 0 | Status: DISCONTINUED | OUTPATIENT
Start: 2024-12-18 | End: 2024-12-19

## 2024-12-18 RX ORDER — BENZOCAINE 10 %
1 OINTMENT (GRAM) TOPICAL EVERY 6 HOURS
Refills: 0 | Status: DISCONTINUED | OUTPATIENT
Start: 2024-12-18 | End: 2024-12-19

## 2024-12-18 RX ORDER — IBUPROFEN 200 MG
1 TABLET ORAL
Qty: 28 | Refills: 0
Start: 2024-12-18 | End: 2024-12-24

## 2024-12-18 RX ORDER — HYDROCORTISONE BUTYRATE 0.1 %
1 CREAM (GRAM) TOPICAL EVERY 6 HOURS
Refills: 0 | Status: DISCONTINUED | OUTPATIENT
Start: 2024-12-18 | End: 2024-12-19

## 2024-12-18 RX ORDER — SIMETHICONE 125 MG
80 CAPSULE ORAL EVERY 4 HOURS
Refills: 0 | Status: DISCONTINUED | OUTPATIENT
Start: 2024-12-18 | End: 2024-12-19

## 2024-12-18 RX ORDER — DIPHENHYDRAMINE HCL 25 MG
25 CAPSULE ORAL EVERY 6 HOURS
Refills: 0 | Status: DISCONTINUED | OUTPATIENT
Start: 2024-12-18 | End: 2024-12-19

## 2024-12-18 RX ADMIN — Medication 167 MILLIUNIT(S)/MIN: at 03:53

## 2024-12-18 RX ADMIN — ACETAMINOPHEN 500MG 975 MILLIGRAM(S): 500 TABLET, COATED ORAL at 08:51

## 2024-12-18 RX ADMIN — .BETA.-CAROTENE, SODIUM ACETATE, ASCORBIC ACID, CHOLECALCIFEROL, .ALPHA.-TOCOPHEROL ACETATE, DL-, THIAMINE MONONITRATE, RIBOFLAVIN, NIACINAMIDE, PYRIDOXINE HYDROCHLORIDE, FOLIC ACID, CYANOCOBALAMIN, CALCIUM CARBONATE, FERROUS FUMARATE, ZINC OXIDE AND CUPRIC OXIDE 1 TABLET(S): 2000; 2000; 120; 400; 22; 1.84; 3; 20; 10; 1; 12; 200; 27; 25; 2 TABLET ORAL at 15:55

## 2024-12-18 RX ADMIN — Medication 600 MILLIGRAM(S): at 23:49

## 2024-12-18 RX ADMIN — Medication 108 GRAM(S): at 03:18

## 2024-12-18 RX ADMIN — ACETAMINOPHEN 500MG 975 MILLIGRAM(S): 500 TABLET, COATED ORAL at 15:56

## 2024-12-18 RX ADMIN — KETOROLAC TROMETHAMINE 30 MILLIGRAM(S): 30 INJECTION INTRAMUSCULAR; INTRAVENOUS at 04:23

## 2024-12-18 NOTE — DISCHARGE NOTE OB - CARE PLAN
Principal Discharge DX:	 (normal spontaneous vaginal delivery)  Assessment and plan of treatment:	Please call your provider to schedule postpartum visit in 6 weeks. Take medications as directed, regular diet, activity as tolerated. Exclusive breast feeding for the first 6 months is recommended. Nothing per vagina for 6 weeks (incl. sex, douching, etc). If you have additional concerns, please inform your provider.  Secondary Diagnosis:	Anemia due to acute blood loss  Assessment and plan of treatment:	Please call your doctor or come to the ED if you experience heavy bleeding, lightheadedness/dizziness, chest pain, difficulty breathing, syncope.   1

## 2024-12-18 NOTE — DISCHARGE NOTE OB - FINANCIAL ASSISTANCE
[facility] provides services at a reduced cost to those who are determined to be eligible through [facility]’s financial assistance program. Information regarding [facility]’s financial assistance program can be found by going to [email or web address] or by calling [phone].

## 2024-12-18 NOTE — OB NEONATOLOGY/PEDIATRICIAN DELIVERY SUMMARY - NSPEDSNEONOTESA_OBGYN_ALL_OB_FT
OB requested me to attend vaginal delivery at 33.5 weeks. The mother is 23 y/o, , A+, HIV NR, RPR NR, HBsAg NR, RI, Hep-C NR, GBS UK. The mom was transferred from [facility]  L & D: SROM @ 23:20, Blood tinged .  , BULB OB requested me to attend vaginal delivery at 33.5 weeks. The mother is 25 y/o, , A+, HIV NR, RPR NR, HBsAg NR, RI, Hep-C NR, GBS UK. The mom was transferred from [facility]  L & D: SROM @ 23:20, Blood tinged .  , bulb suctioned and dried, vigorous, APGAR 9 & 9. BW: 2230gms. The baby developed respiratory distress O2 desaturation at 15 min in NICU, initial a/c 27mg%  Assessment:  AGA, BB,  with respiratory distress of , presumed sepsis,  hypoglycemia  Plan: admit to NICU for further management.

## 2024-12-18 NOTE — DISCHARGE NOTE OB - PATIENT PORTAL LINK FT
You can access the FollowMyHealth Patient Portal offered by [facility] by registering at the following website: [email or web address]. By joining [facility]’s FollowMyHealth portal, you will also be able to view your health information using other applications (apps) compatible with our system.

## 2024-12-18 NOTE — OB RN DELIVERY SUMMARY - NS_SEPSISRSKCALC_OBGYN_ALL_OB_FT
Unable to calculate the EOS score due to gestational age being less than 34 weeks or more than 43 weeks.  GBS status in the 'Prenatal Lab tests/results section' on the OB RN Patient Profile must be documented.   Unable to calculate the EOS score due to gestational age being less than 34 weeks or more than 43 weeks.

## 2024-12-18 NOTE — DISCHARGE NOTE OB - PLAN OF CARE
Please call your doctor or come to the ED if you experience heavy bleeding, lightheadedness/dizziness, chest pain, difficulty breathing, syncope. Please call your provider to schedule postpartum visit in 6 weeks. Take medications as directed, regular diet, activity as tolerated. Exclusive breast feeding for the first 6 months is recommended. Nothing per vagina for 6 weeks (incl. sex, douching, etc). If you have additional concerns, please inform your provider.

## 2024-12-18 NOTE — DISCHARGE NOTE OB - CARE PROVIDER_API CALL
Delisa Armendariz  Obstetrics and Gynecology  [facility] [place] [address]  Phone: [phone]  Fax: [phone]  Follow Up Time:

## 2024-12-18 NOTE — OB RN DELIVERY SUMMARY - NSSELHIDDEN_OBGYN_ALL_OB_FT
[NS_[email or web address]_ALL_OB_FT:[id number]],[NS_DeliveryAssist2_OBGYN_ALL_OB_FT:[id number]],[NS_DeliveryRN_OBGYN_ALL_OB_FT:[email or web address]=]

## 2024-12-18 NOTE — OB PROVIDER DELIVERY SUMMARY - NSPROVIDERDELIVERYNOTE_OBGYN_ALL_OB_FT
at 0352 of a live male, 2230gm and Apgars 9/9. Delivered OA, no nuchal cord, clear fluid. Infant's head delivered with maternal expulsive efforts. Shoulders delivered without difficulty followed by the rest of the body. Nose and mouth were bulb suctioned. Cord clamped and cut after delay. Samples obtained. Baby handed to patient. Placenta delivered spontaneously, intact, 3VC. Fundus firm, minimal bleeding. Perineum and vagina inspected – small first degree perineal laceration repaired with chromic. EBL 150cc. Hemostasis noted. Pt tolerated procedure well, in stable condition, recovering in LDR. Infant in LDR. Instrument/sponge count correct x 2 and confirmed by nurse.  at 0352 of a live male, 2230gm and Apgars 9/9. Delivered OA, no nuchal cord, clear fluid. Infant's head delivered with maternal expulsive efforts. Shoulders delivered without difficulty followed by the rest of the body. Nose and mouth were bulb suctioned. Cord clamped and cut after delay. Samples obtained. Baby handed to patient. Placenta delivered spontaneously, intact, 3VC. Fundus firm, minimal bleeding. Perineum and vagina inspected – small first degree perineal laceration repaired with chromic. EBL 150cc. Hemostasis noted. Pt tolerated procedure well, in stable condition, recovering in LDR. Infant in LDR. Instrument/sponge count correct x 2 and confirmed by nurse.        Dr Lilly

## 2024-12-18 NOTE — OB PROVIDER DELIVERY SUMMARY - NSSELHIDDEN_OBGYN_ALL_OB_FT
[NS_DeliveryAttending1_OBGYN_ALL_OB_FT:[id number]],[NS_DeliveryAssist2_OBGYN_ALL_OB_FT:[id number]=],[NS_DeliveryRN_OBGYN_ALL_OB_FT:[email or web address]=]

## 2024-12-19 VITALS
DIASTOLIC BLOOD PRESSURE: 68 MMHG | SYSTOLIC BLOOD PRESSURE: 102 MMHG | TEMPERATURE: 98 F | HEART RATE: 92 BPM | RESPIRATION RATE: 17 BRPM | OXYGEN SATURATION: 97 %

## 2024-12-19 LAB
GROUP B BETA STREP DNA (PCR): SIGNIFICANT CHANGE UP
SOURCE GROUP B STREP: SIGNIFICANT CHANGE UP

## 2024-12-19 PROCEDURE — 76815 OB US LIMITED FETUS(S): CPT

## 2024-12-19 PROCEDURE — 80053 COMPREHEN METABOLIC PANEL: CPT

## 2024-12-19 PROCEDURE — 85730 THROMBOPLASTIN TIME PARTIAL: CPT

## 2024-12-19 PROCEDURE — 85025 COMPLETE CBC W/AUTO DIFF WBC: CPT

## 2024-12-19 PROCEDURE — 87086 URINE CULTURE/COLONY COUNT: CPT

## 2024-12-19 PROCEDURE — 90715 TDAP VACCINE 7 YRS/> IM: CPT

## 2024-12-19 PROCEDURE — 87800 DETECT AGNT MULT DNA DIREC: CPT

## 2024-12-19 PROCEDURE — 86780 TREPONEMA PALLIDUM: CPT

## 2024-12-19 PROCEDURE — 87653 STREP B DNA AMP PROBE: CPT

## 2024-12-19 PROCEDURE — 84550 ASSAY OF BLOOD/URIC ACID: CPT

## 2024-12-19 PROCEDURE — 86900 BLOOD TYPING SEROLOGIC ABO: CPT

## 2024-12-19 PROCEDURE — 85384 FIBRINOGEN ACTIVITY: CPT

## 2024-12-19 PROCEDURE — 59050 FETAL MONITOR W/REPORT: CPT

## 2024-12-19 PROCEDURE — 88307 TISSUE EXAM BY PATHOLOGIST: CPT

## 2024-12-19 PROCEDURE — 86850 RBC ANTIBODY SCREEN: CPT

## 2024-12-19 PROCEDURE — 36415 COLL VENOUS BLD VENIPUNCTURE: CPT

## 2024-12-19 PROCEDURE — 81001 URINALYSIS AUTO W/SCOPE: CPT

## 2024-12-19 PROCEDURE — 86901 BLOOD TYPING SEROLOGIC RH(D): CPT

## 2024-12-19 RX ORDER — TETANUS TOXOID, REDUCED DIPHTHERIA TOXOID AND ACELLULAR PERTUSSIS VACCINE, ADSORBED 5; 2.5; 8; 8; 2.5 [IU]/.5ML; [IU]/.5ML; UG/.5ML; UG/.5ML; UG/.5ML
0.5 SUSPENSION INTRAMUSCULAR ONCE
Refills: 0 | Status: COMPLETED | OUTPATIENT
Start: 2024-12-19 | End: 2024-12-19

## 2024-12-19 RX ADMIN — TETANUS TOXOID, REDUCED DIPHTHERIA TOXOID AND ACELLULAR PERTUSSIS VACCINE, ADSORBED 0.5 MILLILITER(S): 5; 2.5; 8; 8; 2.5 SUSPENSION INTRAMUSCULAR at 06:06

## 2024-12-19 RX ADMIN — ACETAMINOPHEN 500MG 975 MILLIGRAM(S): 500 TABLET, COATED ORAL at 02:11

## 2024-12-19 RX ADMIN — .BETA.-CAROTENE, SODIUM ACETATE, ASCORBIC ACID, CHOLECALCIFEROL, .ALPHA.-TOCOPHEROL ACETATE, DL-, THIAMINE MONONITRATE, RIBOFLAVIN, NIACINAMIDE, PYRIDOXINE HYDROCHLORIDE, FOLIC ACID, CYANOCOBALAMIN, CALCIUM CARBONATE, FERROUS FUMARATE, ZINC OXIDE AND CUPRIC OXIDE 1 TABLET(S): 2000; 2000; 120; 400; 22; 1.84; 3; 20; 10; 1; 12; 200; 27; 25; 2 TABLET ORAL at 12:12

## 2024-12-19 RX ADMIN — ACETAMINOPHEN 500MG 975 MILLIGRAM(S): 500 TABLET, COATED ORAL at 09:03

## 2024-12-19 RX ADMIN — Medication 600 MILLIGRAM(S): at 12:12

## 2024-12-19 NOTE — PROGRESS NOTE ADULT - SUBJECTIVE AND OBJECTIVE BOX
BRAXTON MONTAGUE is a 24y  now PPD#1 s/p spontaneous vaginal delivery at 33w5d gestation 2/2  labor, uncomplicated.    S:    No acute events overnight.   The patient has no complaints.  Pain controlled with current treatment regimen.   She is ambulating without difficulty and tolerating PO.   + flatus/+BM/+ voiding   She endorses appropriate lochia, which is decreasing.   She is breastfeeding without difficulty.   She denies fevers, chills, nausea and vomiting.   She denies headache, chest pain and SOB.     O:    T(C): 37 (24 @ 00:31), Max: 37.2 (24 @ 05:53)  HR: 88 (24 @ 00:31) (88 - 107)  BP: 109/70 (24 @ 00:31) (101/59 - 138/73)  RR: 17 (24 @ 00:31) (16 - 18)  SpO2: 95% (24 @ 00:31) (95% - 98%)    Gen: NAD, AOx3  Pulm: Normal respiratory effort on RA  Abdomen:  Soft, non-tender, non-distended  Uterus:  Fundus firm below umbilicus  Ext:  Non-tender and non-edematous                          9.9    18.91 )-----------( 307      ( 18 Dec 2024 12:39 )             28.8         134[L]  |  103  |  5.1[L]  ----------------------------<  86  3.8   |  18.0[L]  |  0.49[L]    Ca    9.2      17 Dec 2024 12:12    TPro  6.6  /  Alb  3.5  /  TBili  <0.2[L]  /  DBili  x   /  AST  25  /  ALT  15  /  AlkPhos  192[H]

## 2024-12-19 NOTE — PROGRESS NOTE ADULT - ASSESSMENT
A/P:  BRAXTON MONTAGUE is a 24y  now PPD#1 s/p spontaneous vaginal delivery at 33w5d gestation 2/2  labor, uncomplicated.    -Vital signs stable  -Hgb: 12.2-> 9.9  -Voiding, tolerating PO, bowel function nml   -Advance care as tolerated   -Continue routine postpartum care and education  -Healthy male infant, patient is unsure about circumcision  -Dispo: Patient to be discharged when meeting all postpartum milestones and pending attending approval.

## 2024-12-19 NOTE — PROGRESS NOTE ADULT - ATTENDING COMMENTS
Pt is a 24y  now PPD#1 s/p spontaneous vaginal delivery at 33w5d gestation 2/2  labor, uncomplicated.  She feels well today and is without complaints.  Her pain is well controlled and her bleeding is light to moderate.  continue routine PP care    FLORES Scott

## 2024-12-26 LAB — SURGICAL PATHOLOGY STUDY: SIGNIFICANT CHANGE UP
